# Patient Record
Sex: FEMALE | Race: WHITE
[De-identification: names, ages, dates, MRNs, and addresses within clinical notes are randomized per-mention and may not be internally consistent; named-entity substitution may affect disease eponyms.]

---

## 2017-03-10 ENCOUNTER — HOSPITAL ENCOUNTER (OUTPATIENT)
Dept: HOSPITAL 45 - C.MAMM | Age: 47
Discharge: HOME | End: 2017-03-10
Attending: OBSTETRICS & GYNECOLOGY
Payer: COMMERCIAL

## 2017-03-10 DIAGNOSIS — N63: ICD-10-CM

## 2017-03-10 DIAGNOSIS — R92.1: ICD-10-CM

## 2017-03-10 DIAGNOSIS — Z12.31: Primary | ICD-10-CM

## 2017-03-10 NOTE — MAMMOGRAPHY REPORT
BILATERAL DIGITAL SCREENING MAMMOGRAM TOMOSYNTHESIS WITH CAD: 3/10/2017

CLINICAL HISTORY: Routine screening.  Patient has no complaints.  





TECHNIQUE:  Breast tomosynthesis in addition to standard 2D mammography was performed. Current study
 was also evaluated with a Computer Aided Detection (CAD) system.  



COMPARISON: Comparison is made to exams dated:  3/4/2016 mammogram, 2/21/2014 mammogram, 2/26/2015 m
ammogram, 2/8/2012 mammogram, 2/14/2013 mammogram, and 1/27/2011 mammogram - [facility]
enter.   



BREAST COMPOSITION:  The tissue of both breasts is extremely dense, which lowers the sensitivity of 
mammography.  



FINDINGS:  There are 2 small clusters of calcifications seen within the left upper outer quadrant an
teriorly which are increased compared to prior exams.  While these could represent milk of calcium/f
ibrocystic changes, spot magnification views are recommended for further evaluation.  Additionally, 
there is a round newly visualized partially circumscribed and partially obscured 17 mm mass seen wit
hin the left lateral breast on the cc tomosynthesis images, for which ultrasound and possible additi
onal spot compression views are recommended.  This likely represents a cyst.



The remainder of both breasts are stable compared to prior exams, without suspicious masses, calcifi
cations, or areas of architectural distortion noted.  Other scattered bilateral benign-appearing calderon
cifications are not significantly changed, many of which have demonstrated layering on prior exams c
onsistent with milk of calcium.





IMPRESSION:  ACR BI-RADS CATEGORY 0: INCOMPLETE EVALUATION:  NEED ADDITIONAL IMAGING EVALUATION

Left breast calcifications and left breast mass, for which additional imaging evaluation is recommen
ded.  The patient will be called to schedule an appointment.  





Approximately 10% of breast cancers are not detected with mammography. A negative mammographic repor
t should not delay biopsy if a clinically suggestive mass is present.



Winter Granda M.D.          

/:3/10/2017 15:54:49  



Imaging Technologist: Annmarie Trevizo, [facility]

letter sent: Addl Imaging 0  

BI-RADS Code: ACR BI-RADS Category 0: Incomplete Evaluation:  Need Additional Imaging Evaluation

## 2017-03-17 ENCOUNTER — HOSPITAL ENCOUNTER (OUTPATIENT)
Dept: HOSPITAL 45 - C.MAMM | Age: 47
Discharge: HOME | End: 2017-03-17
Attending: OBSTETRICS & GYNECOLOGY
Payer: COMMERCIAL

## 2017-03-17 DIAGNOSIS — R92.0: ICD-10-CM

## 2017-03-17 DIAGNOSIS — N63: Primary | ICD-10-CM

## 2017-03-17 NOTE — MAMMOGRAPHY REPORT
UNILATERAL LEFT DIGITAL DIAGNOSTIC MAMMOGRAM AND TARGETED LEFT ULTRASOUND: 3/17/2017

CLINICAL HISTORY: 46-year-old woman called back from screening mammography for 2 increasing clusters
 of microcalcifications in the lateral left breast, and a 17 mm mass in the lateral left breast seen
 on the tomosynthesis images.  No family history of breast cancer.  





TECHNIQUE: Spot magnification left CC and ML views were obtained.



COMPARISON: Comparison is made to exams dated:  3/10/2017 mammogram, 3/4/2016 mammogram, 2/26/2015 m
ammogram, and 2/21/2014 mammogram - .   



BREAST COMPOSITION:  The tissue of the left breast is extremely dense, which lowers the sensitivity 
of mammography.  



FINDINGS: There are diffuse microcalcifications throughout the visualized left breast.  2 increasing
ly prominent clusters of my calcifications in the lateral anterior breast measure less than 2 mm.  O
n the spot magnification MLO view, nearly all of the calcifications within these clusters demonstrat
e layering and tea-cupping, suggesting benign milk of calcium/fibrocystic change.  Many other microc
alcifications throughout the left breast also demonstrate layering, confirming benignity.  No suspic
ious spiculated mass or focal area of architectural distortion is identified.



Targeted ultrasound was performed in the lateral left breast evaluate the 17 mm mammographic mass.  
In the 3:00 axis, 2 cm from the nipple, there is a lobulated anechoic benign simple cyst with poster
ior acoustic enhancement, measuring 14.2 x 8.1 x 17.1 mm.  This correlates well with the mammographi
c mass and is benign.



IMPRESSION:  ACR-BI-RADS CATEGORY 3: PROBABLY BENIGN, TARGETED ULTRASOUND ACR-BI-RADS CATEGORY 3: OH
OBABLY BENIGN 

1.  The 17 mm mass in the lateral left breast seen on the tomosynthesis images correlates with a usman
ign anechoic simple cyst on ultrasound, identified in the 3:00 left breast.

2.  The increasingly prominent clusters of microcalcifications in the upper outer anterior left jimena
st probably represent benign fibrocystic changes, given that many of the calcifications layer on the
 spot magnification ML view.  However, given that not every calcification layers, a short interval f
ollow-up diagnostic mammogram including spot magnification views is recommended to ensure stability 
in 6 months.

3.  Overall, the mammographic and sonographic findings suggest benign fibrocystic changes.  



These results and recommendations were discussed with the patient at the time of the exam.  She tent
atively scheduled a follow-up appointment prior to leaving our department.



Approximately 10% of breast cancers are not detected with mammography. A negative mammographic repor
t should not delay biopsy if a clinically suggestive mass is present.



Kaye Bello M.D.          

ay/:3/17/2017 10:16:03  



Imaging Technologist: Denise SERRANO(BROOKE)(QUAN), 

letter sent: Follow Up Recommended 3  

BI-RADS Code: ACR-BI-RADS Category 3: Probably Benign  Ultrasound BI-RADS: ACR-BI-RADS Category 3: P
robably Benign

## 2017-09-04 ENCOUNTER — HOSPITAL ENCOUNTER (EMERGENCY)
Dept: HOSPITAL 45 - C.EDB | Age: 47
Discharge: HOME | End: 2017-09-04
Payer: COMMERCIAL

## 2017-09-04 VITALS
BODY MASS INDEX: 20.28 KG/M2 | WEIGHT: 132.28 LBS | HEIGHT: 67.52 IN | WEIGHT: 132.28 LBS | HEIGHT: 67.52 IN | BODY MASS INDEX: 20.28 KG/M2

## 2017-09-04 VITALS — DIASTOLIC BLOOD PRESSURE: 89 MMHG | HEART RATE: 61 BPM | OXYGEN SATURATION: 99 % | SYSTOLIC BLOOD PRESSURE: 142 MMHG

## 2017-09-04 VITALS — TEMPERATURE: 97.34 F
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
## 2017-09-04 DIAGNOSIS — S92.354A: Primary | ICD-10-CM

## 2017-09-04 NOTE — DIAGNOSTIC IMAGING REPORT
RIGHT FOOT MIN 3 VIEWS ROUTINE



HISTORY:  47 years-old Female right foot pain, injury

Right acute right foot pain status post injury while running. Initial exam.



COMPARISON: None available.



TECHNIQUE: 3 views of the right foot.



FINDINGS: 

There is an acute nondisplaced transverse fracture involving the metadiaphyseal

junction of the proximal aspect fifth metatarsal approximately 1.7 cm distal to

the proximal tuberosity. No definite distal extension is seen. Mild associated

soft tissue swelling. Negative for opaque foreign body. No significant

degenerative changes.



IMPRESSION: 

Acute nondisplaced fracture involves the metadiaphyseal junction of the fifth

metatarsal with mild associated soft tissue swelling. Note that these type of

fractures have been reported to demonstrate high rates of non-union ([place]

fracture).







The above report was generated using voice recognition software. It may contain

grammatical, syntax or spelling errors.







Electronically signed by:  Jack Foote M.D.

9/4/2017 12:10 PM



Dictated Date/Time:  9/4/2017 12:07 PM

## 2017-09-04 NOTE — EMERGENCY ROOM VISIT NOTE
ED Visit Note


First contact with patient:  11:18


CHIEF COMPLAINT: Foot pain  





HISTORY OF PRESENT ILLNESS: This 47-year-old female patient presents to the 

emergency department ambulatory complaining of swelling and pain in the right 

foot at rest and worse with weight bearing. The patient was running and stepped 

off a curb and rolled her right foot and ankle. The patient rates the pain as 

sharp and 8/10. The patient has no relief of the pain. The patient is not able 

to walk. No numbness or weakness. No ankle pain. There are no lacerations of 

the foot. The patient is able to move all of their toes and their ankle without 

pain. Patient denies previous injury to this foot.





REVIEW OF SYSTEMS: GENERAL: A 6 system review of systems was completed with 

positives and pertinent negatives in the HPI.





ALLERGIES: NKDA





MEDICATIONS: Patient denies





PMH: Patient denies





SOCIAL HISTORY: The patient lives locally.  She does not smoke





PHYSICAL EXAM: Vital Signs: Reviewed Nurse's notes, vital signs stable. GENERAL

: This is a 47-year-old female, in no acute distress, but appears in pain, well-

developed, well-nourished. MUSCULOSKELETAL: There is no visual deformity of the 

right foot. There is no erythema but moderate ecchymosis, edema and tenderness 

over the fifth metatarsal. There is no warmth. There is tenderness and swelling 

over the fifth metatarsal of the right foot. The range of motion of the foot is 

is not significantly limited secondary to pain. There is no tenderness over the 

plantar fascia. Dorsi flexion 5/5 and Plantar flexion 5/5. The skin is intact 

and there are no lacerations or puncture wounds. Dorsalis pedis pulse 2+.  

Capillary refill less than 2 seconds. 





EMERGENCY DEPARTMENT COURSE: I examined the patient. An X-ray of the right foot 

walking bootand instructed on the use of crutches. The patient was discharged 

home in good condition.





I do recommend the patient not airway on the foot until seen by orthopedics 

given the nature of the fracture.  I offered an Ortho-Glass for the walking 

boot.  She prefers a walking boot and states that she will not bear weight on 

her foot.























[~ rep ct add3]]


RIGHT FOOT MIN 3 VIEWS ROUTINE





HISTORY:  47 years-old Female right foot pain, injury


Right acute right foot pain status post injury while running. Initial exam.





COMPARISON: None available.





TECHNIQUE: 3 views of the right foot.





FINDINGS: 


There is an acute nondisplaced transverse fracture involving the metadiaphyseal


junction of the proximal aspect fifth metatarsal approximately 1.7 cm distal to


the proximal tuberosity. No definite distal extension is seen. Mild associated


soft tissue swelling. Negative for opaque foreign body. No significant


degenerative changes.





IMPRESSION: 


Acute nondisplaced fracture involves the metadiaphyseal junction of the fifth


metatarsal with mild associated soft tissue swelling. Note that these type of


fractures have been reported to demonstrate high rates of non-union ([place]on


fracture).








Current/Historical Medications


Scheduled


Doxycycline (Monohydrate) (Doxycycline), 50 MG PO DAILY





Allergies


Coded Allergies:  


     No Known Allergies (Unverified , 9/4/17)





Vital Signs











  Date Time  Temp Pulse Resp B/P (MAP) Pulse Ox O2 Delivery O2 Flow Rate FiO2


 


9/4/17 12:37  61 16 142/89 99   


 


9/4/17 11:15 36.3 62 16 157/98 96 Room Air  











Medications Administered











 Medications


  (Trade)  Dose


 Ordered  Sig/Rachel


 Route  Start Time


 Stop Time Status Last Admin


Dose Admin


 


 Ibuprofen


  (Motrin Tab)  600 mg  NOW  STAT


 PO  9/4/17 12:09


 9/4/17 12:10 DC 9/4/17 12:14


600 MG











Departure Information


Impression





 Primary Impression:  


 Fracture of fifth metatarsal bone





Dispostion


Home / Self-Care





Condition


GOOD





Referrals


Gil Narayanan M.D. (PCP)








Josr Davis, DO





Patient Instructions


Fifth Metat[facility]





Additional Instructions





Ice and elevation for 24-48 hrs. Ibuprofen, 600mg  every 6 hours for the pain. 

Wear the walking boot until seen by orthopedics. Crutches to assist in 

ambulation. Contact orthopedics first thing tomorrow morning to schedule a 

follow up appointment. Return with worsening symptoms





Problem Qualifiers








 Primary Impression:  


 Fracture of fifth metatarsal bone


 Encounter type:  initial encounter  Fracture type:  closed  Fracture alignment

:  nondisplaced  Laterality:  right  Qualified Codes:  S92.354A - Nondisplaced 

fracture of fifth metatarsal bone, right foot, initial encounter for closed 

fracture

## 2017-09-15 ENCOUNTER — HOSPITAL ENCOUNTER (OUTPATIENT)
Dept: HOSPITAL 45 - C.MAMM | Age: 47
Discharge: HOME | End: 2017-09-15
Attending: OBSTETRICS & GYNECOLOGY
Payer: COMMERCIAL

## 2017-09-15 DIAGNOSIS — R92.1: Primary | ICD-10-CM

## 2018-03-13 ENCOUNTER — HOSPITAL ENCOUNTER (OUTPATIENT)
Dept: HOSPITAL 45 - C.MAMM | Age: 48
Discharge: HOME | End: 2018-03-13
Attending: FAMILY MEDICINE
Payer: COMMERCIAL

## 2018-03-13 DIAGNOSIS — Z12.31: Primary | ICD-10-CM

## 2018-03-14 NOTE — MAMMOGRAPHY REPORT
BILATERAL DIGITAL SCREENING MAMMOGRAM TOMOSYNTHESIS WITH CAD: 3/13/2018

CLINICAL HISTORY: Routine screening.  Patient has no complaints.  





TECHNIQUE:  Breast tomosynthesis in addition to standard 2D mammography was performed. Current study 
was also evaluated with a Computer Aided Detection (CAD) system.  



COMPARISON: Comparison is made to exams dated:  9/15/2017 mammogram, 3/17/2017 mammogram, 3/10/2017 m
ammogram, 3/4/2016 mammogram, 2/26/2015 mammogram, and 2/21/2014 mammogram - [facility]
nter.   



BREAST COMPOSITION:  The tissue of both breasts is extremely dense, which lowers the sensitivity of m
ammography.  



FINDINGS: There are diffuse bilateral round and punctate microcalcifications and multiple bilateral c
ircumscribed masses scattered in the breasts.  No suspicious spiculated or irregular mass, architectu
ral distortion or cluster of new, suspicious microcalcifications is seen.  



IMPRESSION:  ACR BI-RADS CATEGORY 1: NEGATIVE

There is no mammographic evidence of malignancy. A 1 year screening mammogram is recommended.  The pa
tient will receive written notification of the results.  





Approximately 10% of breast cancers are not detected with mammography. A negative mammographic report
 should not delay biopsy if a clinically suggestive mass is present.



Kaye Bello M.D.          

ay/:3/13/2018 16:09:00  



Imaging Technologist: Suma SERRANO(BROOKE)(QUAN)(BD), [facility]

letter sent: Normal 1/2  

BI-RADS Code: ACR BI-RADS Category 1: Negative

## 2019-01-30 NOTE — MAMMOGRAPHY REPORT
UNILATERAL LEFT DIGITAL DIAGNOSTIC MAMMOGRAM TOMOSYNTHESIS WITH CAD: 9/15/2017

CLINICAL HISTORY: Six-month follow-up of left breast calcifications.  





TECHNIQUE:  Breast tomosynthesis in addition to standard 2D mammography was performed. Current study 
was also evaluated with a Computer Aided Detection (CAD) system.  Left CC and MLO 2-D and tomosynthes
is images and spot magnification left CC and ML views were obtained.



COMPARISON: Comparison is made to exams dated:  3/17/2017 ultrasound, 3/17/2017 mammogram, 3/10/2017 
mammogram, 3/4/2016 mammogram, 2/26/2015 mammogram, and 2/21/2014 mammogram - [facility]
enter.   



BREAST COMPOSITION:  The tissue of the left breast is extremely dense, which lowers the sensitivity o
f mammography.  



FINDINGS: Spot magnification views of the left breast again demonstrate diffusely scattered calcifica
tions throughout the left superior breast, the majority of which demonstrate layering on the lateral 
view, consistent with benign milk of calcium.  The 2 previously described small clusters of calcifica
tions in the left superior anterior breast are decreased compared to the March 2017 exam; additionall
y, the calcifications in the clusters demonstrated layering on the prior exam.  Given the layering an
d given the decrease, the calcifications are benign and consistent with milk of calcium.  No new or s
uspicious clusters of microcalcifications are noted on the additional views.  The remainder of the le
ft breast is stable compared to prior exams, without suspicious masses, calcifications, or areas of a
rchitectural distortion noted.



IMPRESSION:  ACR BI-RADS CATEGORY 2: BENIGN

The 2 small clusters of calcifications in the left superior anterior breast are decreased compared to
 the prior exam, and are benign and compatible with milk of calcium.  There is no mammographic eviden
ce of malignancy in the left breast. Return to annual mammogram screening schedule is recommended, du
e March 2018.  The patient has been verbally notified of the results.  





Approximately 10% of breast cancers are not detected with mammography. A negative mammographic report
 should not delay biopsy if a clinically suggestive mass is present.



Winter Granda M.D.          

ah/:9/15/2017 08:29:36  



Imaging Technologist: Annmarie SERRANO(R)(M), [facility]

letter sent: Normal 1/2  

BI-RADS Code: ACR BI-RADS Category 2: Benign Detail Level: Zone Continue Regimen: Triamcinolone bid prn flares